# Patient Record
Sex: MALE | Race: WHITE | URBAN - METROPOLITAN AREA
[De-identification: names, ages, dates, MRNs, and addresses within clinical notes are randomized per-mention and may not be internally consistent; named-entity substitution may affect disease eponyms.]

---

## 2024-10-02 ENCOUNTER — OFFICE VISIT (OUTPATIENT)
Age: 24
End: 2024-10-02

## 2024-10-02 VITALS
WEIGHT: 215 LBS | SYSTOLIC BLOOD PRESSURE: 103 MMHG | BODY MASS INDEX: 29.12 KG/M2 | HEIGHT: 72 IN | DIASTOLIC BLOOD PRESSURE: 68 MMHG | TEMPERATURE: 97.7 F | HEART RATE: 64 BPM | OXYGEN SATURATION: 96 %

## 2024-10-02 DIAGNOSIS — R10.32 LEFT LOWER QUADRANT ABDOMINAL PAIN: Primary | ICD-10-CM

## 2024-10-02 RX ORDER — CYCLOBENZAPRINE HCL 5 MG
5 TABLET ORAL 3 TIMES DAILY PRN
Qty: 9 TABLET | Refills: 0 | Status: SHIPPED | OUTPATIENT
Start: 2024-10-02 | End: 2024-10-02

## 2024-10-02 RX ORDER — CYCLOBENZAPRINE HCL 5 MG
5 TABLET ORAL 3 TIMES DAILY PRN
Qty: 9 TABLET | Refills: 0 | Status: SHIPPED | OUTPATIENT
Start: 2024-10-02 | End: 2024-10-05

## 2024-10-02 ASSESSMENT — ENCOUNTER SYMPTOMS
VOMITING: 0
ABDOMINAL PAIN: 1
SHORTNESS OF BREATH: 0
DIARRHEA: 0
NAUSEA: 0
CHEST TIGHTNESS: 0
COUGH: 0
CONSTIPATION: 0

## 2024-10-02 NOTE — PROGRESS NOTES
Zohaib Chambers (:  2000) is a 24 y.o. male,New patient, here for evaluation of the following chief complaint(s):  Abdominal Pain (Left sided abdominal pain x 3 days, lingering pain, pain at rest and with movement)      ASSESSMENT/PLAN:    ICD-10-CM    1. Left lower quadrant abdominal pain  R10.32 cyclobenzaprine (FLEXERIL) 5 MG tablet     DISCONTINUED: cyclobenzaprine (FLEXERIL) 5 MG tablet          Patient presents for left lower abdominal pain.   On exam normoactive bowel sounds, no abdominal tenderness.   Exam and history consistent with musculoskeletal etiology of abdominal pain, will rx flexeril for symptoms.   Please take medication as prescribed.   Please follow up with PCP for further evaluation.   Please go to ED if you have worsening symptoms.   Please note that medication prescribed can cause drowsiness, do not drive or operate heavy machinery after cooking.     SUBJECTIVE/OBJECTIVE:    History provided by:  Patient   used: No    Abdominal Pain  Pertinent negatives include no constipation, diarrhea, fever, nausea or vomiting.     HPI:   24 y.o. male presents with symptoms of LLQ abdominal pain ongoing since Monday morning. He states that he had pain that was sore to touch; pain has become more prevalent since onset. No N/V/C/D. No previous abdominal surgeries. No alleviating factors. He states that some amount of motion does make it worse. He has not had this before. Has not taken medications for symptoms. No dysuria. No concern for STD. He states that he was playing basketball on      Vitals:    10/02/24 1554   BP: 103/68   Site: Right Upper Arm   Position: Sitting   Cuff Size: Large Adult   Pulse: 64   Temp: 97.7 °F (36.5 °C)   TempSrc: Oral   SpO2: 96%   Weight: 97.5 kg (215 lb)   Height: 1.829 m (6')       Review of Systems   Constitutional:  Negative for chills, diaphoresis, fatigue and fever.   Respiratory:  Negative for cough, chest tightness and shortness of breath.

## 2024-10-02 NOTE — PATIENT INSTRUCTIONS
Please take medication as prescribed.   Please follow up with PCP for further evaluation.   Please go to ED if you have worsening symptoms.   Please note that medication prescribed can cause drowsiness, do not drive or operate heavy machinery after cooking.